# Patient Record
Sex: FEMALE | Race: WHITE | NOT HISPANIC OR LATINO | URBAN - METROPOLITAN AREA
[De-identification: names, ages, dates, MRNs, and addresses within clinical notes are randomized per-mention and may not be internally consistent; named-entity substitution may affect disease eponyms.]

---

## 2020-07-24 ENCOUNTER — TELEPHONE ENCOUNTER (OUTPATIENT)
Dept: URBAN - METROPOLITAN AREA CLINIC 92 | Facility: CLINIC | Age: 40
End: 2020-07-24

## 2020-10-06 ENCOUNTER — TELEPHONE ENCOUNTER (OUTPATIENT)
Dept: URBAN - METROPOLITAN AREA CLINIC 92 | Facility: CLINIC | Age: 40
End: 2020-10-06

## 2020-10-06 RX ORDER — METOCLOPRAMIDE 5 MG/1
1 TABLET BEFORE MEALS TABLET ORAL
Qty: 90 | Refills: 0

## 2020-10-15 ENCOUNTER — OFFICE VISIT (OUTPATIENT)
Dept: URBAN - METROPOLITAN AREA CLINIC 40 | Facility: CLINIC | Age: 40
End: 2020-10-15

## 2020-10-22 ENCOUNTER — OFFICE VISIT (OUTPATIENT)
Dept: URBAN - METROPOLITAN AREA CLINIC 40 | Facility: CLINIC | Age: 40
End: 2020-10-22

## 2020-10-26 ENCOUNTER — OFFICE VISIT (OUTPATIENT)
Dept: URBAN - METROPOLITAN AREA TELEHEALTH 2 | Facility: TELEHEALTH | Age: 40
End: 2020-10-26
Payer: COMMERCIAL

## 2020-10-26 DIAGNOSIS — E55.9 VITAMIN D DEFICIENCY: ICD-10-CM

## 2020-10-26 DIAGNOSIS — K21.9 GERD: ICD-10-CM

## 2020-10-26 DIAGNOSIS — K31.84 GASTROPARESIS: ICD-10-CM

## 2020-10-26 PROBLEM — 34713006 VITAMIN D DEFICIENCY: Status: ACTIVE | Noted: 2020-10-26

## 2020-10-26 PROCEDURE — G8427 DOCREV CUR MEDS BY ELIG CLIN: HCPCS | Performed by: INTERNAL MEDICINE

## 2020-10-26 PROCEDURE — 99213 OFFICE O/P EST LOW 20 MIN: CPT | Performed by: INTERNAL MEDICINE

## 2020-10-26 RX ORDER — METOCLOPRAMIDE 5 MG/1
1 TABLET BEFORE MEALS TABLET ORAL
Qty: 90 | Refills: 0 | Status: ACTIVE | COMMUNITY

## 2020-10-26 RX ORDER — METOCLOPRAMIDE 5 MG/1
1 TABLET BEFORE MEALS TABLET ORAL TID
Qty: 270 TABLET | Refills: 1

## 2020-10-26 RX ORDER — PANTOPRAZOLE SODIUM 40 MG/1
1 TABLET TABLET, DELAYED RELEASE ORAL ONCE A DAY
Qty: 90 | Refills: 1
Start: 2020-01-10

## 2020-10-26 RX ORDER — PANTOPRAZOLE SODIUM 40 MG/1
TAKE ONE TABLET BY MOUTH DAILY TABLET, DELAYED RELEASE ORAL
Qty: 90 | Refills: 0 | Status: ACTIVE | COMMUNITY
Start: 2020-01-10

## 2020-10-26 NOTE — HPI-TODAY'S VISIT:
This is a follow-up appointment for Ms. Farris, a 40 year old /White female, after a previous visit on 10/23/2019, for an evaluation for gastroparesis. The patient states that they are doing well with no current complaints. Requesting refills of both pantoprazole and Reglan. Denies AEs to the medications. She has been unable to wean pantoprazole. Can go only one day without medication until heartburn returns. Admits she is eating more spicy dishes as she is preparing more meals at home.They are mostly compliant with their gastroparesis diet. The patient does not take narcotic pain medicine and does not take medications that decrease motility. No abdominal pain. She is moving her bowels daily. The patient is not a diabetic. At last appointment she was noted to be VitaminD deficient and she was previously treated with Vitamin D shot. The patient has had an upper endoscopy. The EGD findings are in the database of the chart and were reviewed with the patient. Currently on prednisone for poison oak, [place].

## 2021-03-09 ENCOUNTER — LAB OUTSIDE AN ENCOUNTER (OUTPATIENT)
Dept: URBAN - METROPOLITAN AREA CLINIC 40 | Facility: CLINIC | Age: 41
End: 2021-03-09

## 2021-03-09 ENCOUNTER — TELEPHONE ENCOUNTER (OUTPATIENT)
Dept: URBAN - METROPOLITAN AREA CLINIC 92 | Facility: CLINIC | Age: 41
End: 2021-03-09

## 2021-03-09 ENCOUNTER — WEB ENCOUNTER (OUTPATIENT)
Dept: URBAN - METROPOLITAN AREA CLINIC 40 | Facility: CLINIC | Age: 41
End: 2021-03-09

## 2021-03-09 ENCOUNTER — OFFICE VISIT (OUTPATIENT)
Dept: URBAN - METROPOLITAN AREA CLINIC 40 | Facility: CLINIC | Age: 41
End: 2021-03-09
Payer: COMMERCIAL

## 2021-03-09 DIAGNOSIS — R11.2 NAUSEA WITH VOMITING, UNSPECIFIED: ICD-10-CM

## 2021-03-09 DIAGNOSIS — R19.7 DIARRHEA, UNSPECIFIED: ICD-10-CM

## 2021-03-09 PROCEDURE — 99214 OFFICE O/P EST MOD 30 MIN: CPT | Performed by: INTERNAL MEDICINE

## 2021-03-09 RX ORDER — PROMETHAZINE HYDROCHLORIDE 25 MG/1
1 SUPPOSITORY AS NEEDED SUPPOSITORY RECTAL
Qty: 60 | OUTPATIENT
Start: 2021-03-09 | End: 2021-04-08

## 2021-03-09 RX ORDER — PANTOPRAZOLE SODIUM 40 MG/1
1 TABLET TABLET, DELAYED RELEASE ORAL ONCE A DAY
Qty: 90 | Refills: 1 | Status: ACTIVE | COMMUNITY
Start: 2020-01-10

## 2021-03-09 RX ORDER — DICYCLOMINE HYDROCHLORIDE 10 MG/1
1 TABLET CAPSULE ORAL
Qty: 120 TABLET | Refills: 1 | OUTPATIENT
Start: 2021-03-09 | End: 2021-05-08

## 2021-03-09 RX ORDER — METOCLOPRAMIDE 5 MG/1
1 TABLET BEFORE MEALS TABLET ORAL TID
Qty: 270 TABLET | Refills: 1 | Status: ACTIVE | COMMUNITY

## 2021-03-09 NOTE — HPI-TODAY'S VISIT:
Ms. Farris presents for an unscheduled appointment due to nausea vomiting and diarrhea.  Call her following her for gastroparesis and reflux.  She was last seen by the physician assistant on a telehealth appointment in October.  She was stable on her Reglan and pantoprazole.  She was doing well until 830 last night.  She had an acute onset of nausea and vomiting.  This was severe to the point of retching.  No blood in the vomit.  She noted stabbing midepigastric pain as well as diarrhea that was tan in color.  She took a Phenergan but this did not help.  She has been unable to keep anything down this morning.  In terms of meals yesterday she had toast followed by egg salad sandwich and then for supper had nachos with beans and cheese. No fevers or chills.

## 2021-03-10 ENCOUNTER — TELEPHONE ENCOUNTER (OUTPATIENT)
Dept: URBAN - METROPOLITAN AREA CLINIC 40 | Facility: CLINIC | Age: 41
End: 2021-03-10

## 2021-03-11 LAB
A/G RATIO: 2.1
ALBUMIN: 4.4
ALKALINE PHOSPHATASE: 90
ALT (SGPT): 56
APPEARANCE: CLEAR
AST (SGOT): 40
BACTERIA: (no result)
BASO (ABSOLUTE): 0
BASOS: 1
BILIRUBIN, TOTAL: 0.8
BILIRUBIN: NEGATIVE
BUN/CREATININE RATIO: 9
BUN: 7
C-REACTIVE PROTEIN, QUANT: 2
CALCIUM: 9
CARBON DIOXIDE, TOTAL: 23
CAST TYPE: (no result)
CASTS: (no result)
CHLORIDE: 102
COMMENT: (no result)
CREATININE: 0.82
CRYSTAL TYPE: (no result)
CRYSTALS: (no result)
EGFR IF AFRICN AM: 104
EGFR IF NONAFRICN AM: 90
EOS (ABSOLUTE): 0.1
EOS: 2
EPITHELIAL CELLS (NON RENAL): (no result)
EPITHELIAL CELLS (RENAL): (no result)
GLOBULIN, TOTAL: 2.1
GLUCOSE: 121
GLUCOSE: NEGATIVE
HEMATOCRIT: 45.8
HEMATOLOGY COMMENTS:: (no result)
HEMOGLOBIN: 15.5
IMMATURE CELLS: (no result)
IMMATURE GRANS (ABS): 0
IMMATURE GRANULOCYTES: 0
KETONES: NEGATIVE
LIPASE: 22
LYMPHS (ABSOLUTE): 1.9
LYMPHS: 31
MCH: 32.7
MCHC: 33.8
MCV: 97
MICROSCOPIC EXAMINATION: (no result)
MICROSCOPIC EXAMINATION: (no result)
MONOCYTES(ABSOLUTE): 0.6
MONOCYTES: 10
MUCUS THREADS: PRESENT
NEUTROPHILS (ABSOLUTE): 3.6
NEUTROPHILS: 56
NITRITE, URINE: NEGATIVE
NRBC: (no result)
OCCULT BLOOD: NEGATIVE
PH: 6.5
PLATELETS: 239
POTASSIUM: 4.7
PROTEIN, TOTAL: 6.5
PROTEIN: NEGATIVE
RBC: (no result)
RBC: 4.74
RDW: 12.5
SODIUM: 137
SPECIFIC GRAVITY: 1.01
TRICHOMONAS: (no result)
URINE-COLOR: YELLOW
UROBILINOGEN,SEMI-QN: 0.2
WBC ESTERASE: NEGATIVE
WBC: (no result)
WBC: 6.3
YEAST: (no result)

## 2021-03-23 ENCOUNTER — WEB ENCOUNTER (OUTPATIENT)
Dept: URBAN - METROPOLITAN AREA CLINIC 40 | Facility: CLINIC | Age: 41
End: 2021-03-23

## 2021-03-24 ENCOUNTER — WEB ENCOUNTER (OUTPATIENT)
Dept: URBAN - METROPOLITAN AREA CLINIC 40 | Facility: CLINIC | Age: 41
End: 2021-03-24

## 2021-03-25 ENCOUNTER — WEB ENCOUNTER (OUTPATIENT)
Dept: URBAN - METROPOLITAN AREA CLINIC 40 | Facility: CLINIC | Age: 41
End: 2021-03-25

## 2021-03-25 RX ORDER — METOCLOPRAMIDE 5 MG/1
1 TABLET BEFORE MEALS TABLET ORAL TID
OUTPATIENT

## 2021-03-25 RX ORDER — METOCLOPRAMIDE 10 MG/1
1 TABLET BEFORE MEALS TABLET ORAL
Qty: 360 TABLET | Refills: 1 | OUTPATIENT
Start: 2021-03-26

## 2021-04-07 ENCOUNTER — OFFICE VISIT (OUTPATIENT)
Dept: URBAN - METROPOLITAN AREA CLINIC 40 | Facility: CLINIC | Age: 41
End: 2021-04-07

## 2021-04-28 ENCOUNTER — OFFICE VISIT (OUTPATIENT)
Dept: URBAN - METROPOLITAN AREA CLINIC 40 | Facility: CLINIC | Age: 41
End: 2021-04-28

## 2021-05-10 ENCOUNTER — WEB ENCOUNTER (OUTPATIENT)
Dept: URBAN - METROPOLITAN AREA CLINIC 40 | Facility: CLINIC | Age: 41
End: 2021-05-10

## 2021-05-11 ENCOUNTER — WEB ENCOUNTER (OUTPATIENT)
Dept: URBAN - METROPOLITAN AREA CLINIC 40 | Facility: CLINIC | Age: 41
End: 2021-05-11

## 2021-06-09 ENCOUNTER — OFFICE VISIT (OUTPATIENT)
Dept: URBAN - METROPOLITAN AREA CLINIC 40 | Facility: CLINIC | Age: 41
End: 2021-06-09

## 2021-06-23 ENCOUNTER — LAB OUTSIDE AN ENCOUNTER (OUTPATIENT)
Dept: URBAN - METROPOLITAN AREA CLINIC 40 | Facility: CLINIC | Age: 41
End: 2021-06-23

## 2021-06-23 ENCOUNTER — OFFICE VISIT (OUTPATIENT)
Dept: URBAN - METROPOLITAN AREA CLINIC 40 | Facility: CLINIC | Age: 41
End: 2021-06-23
Payer: COMMERCIAL

## 2021-06-23 DIAGNOSIS — K21.9 GERD: ICD-10-CM

## 2021-06-23 DIAGNOSIS — K76.0 FATTY LIVER: ICD-10-CM

## 2021-06-23 DIAGNOSIS — K31.84 GASTROPARESIS: ICD-10-CM

## 2021-06-23 PROCEDURE — 99214 OFFICE O/P EST MOD 30 MIN: CPT | Performed by: INTERNAL MEDICINE

## 2021-06-23 RX ORDER — PANTOPRAZOLE SODIUM 40 MG/1
1 TABLET TABLET, DELAYED RELEASE ORAL ONCE A DAY
Qty: 90 | Refills: 0
Start: 2020-01-10

## 2021-06-23 RX ORDER — METOCLOPRAMIDE 10 MG/1
1 TABLET BEFORE MEALS TABLET ORAL
Qty: 360 TABLET | Refills: 1 | Status: ACTIVE | COMMUNITY
Start: 2021-03-26

## 2021-06-23 RX ORDER — METFORMIN HYDROCHLORIDE 500 MG/1
AS DIRECTED TABLET, FILM COATED ORAL
Status: ACTIVE | COMMUNITY

## 2021-06-23 RX ORDER — METOCLOPRAMIDE 10 MG/1
1 TABLET BEFORE MEALS TABLET ORAL
Qty: 360 | Refills: 0
Start: 2021-03-26

## 2021-06-23 RX ORDER — PANTOPRAZOLE SODIUM 40 MG/1
1 TABLET TABLET, DELAYED RELEASE ORAL ONCE A DAY
Qty: 90 | Refills: 1 | Status: ACTIVE | COMMUNITY
Start: 2020-01-10

## 2021-06-23 NOTE — HPI-TODAY'S VISIT:
Ms. Farris presents to clinic for follow-up.  She was last seen on March 9.  At that appointment she was complaining of diarrhea and abdominal pain.  We got a stat ultrasound that showed gallbladder sludge and a positive Garcia sign.  It also showed fatty liver.  She is referred to general surgery and had cholecystectomy with Dr. Cortés that was complicated by aspiration that she recovered after a 1 night stay in the hospital.  Patient contacted me and noted some worsening of her gastroparesis after surgery.  We increased her Reglan from 5 to 10 mg.  She states that has improved her symptoms immensely.  No side effects from the medication.  Her blood sugar control is also improved.  She was formally diagnosed as diabetic and started on Metformin.  She brings blood work that shows her hemoglobin A1c down to 6.3.  Her ALT is also improved to 38.  Despite her pantoprazole she has noted some breakthrough in her reflux.  She is concerned that her fundoplication may have slipped.  She would like evaluation for that.  Recall her last EGD was February 2018 where we first noted the retained food in her stomach and diagnosed her with gastroparesis.  She denies any dysphagia.  She is moving her bowels daily.

## 2021-06-24 ENCOUNTER — OFFICE VISIT (OUTPATIENT)
Dept: URBAN - METROPOLITAN AREA TELEHEALTH 2 | Facility: TELEHEALTH | Age: 41
End: 2021-06-24
Payer: COMMERCIAL

## 2021-06-24 DIAGNOSIS — K31.84 GASTROPARESIS: ICD-10-CM

## 2021-06-24 PROCEDURE — 97802 MEDICAL NUTRITION INDIV IN: CPT | Performed by: DIETITIAN, REGISTERED

## 2021-06-24 RX ORDER — PANTOPRAZOLE SODIUM 40 MG/1
1 TABLET TABLET, DELAYED RELEASE ORAL ONCE A DAY
Qty: 90 | Refills: 0 | Status: ACTIVE | COMMUNITY
Start: 2020-01-10

## 2021-06-24 RX ORDER — METFORMIN HYDROCHLORIDE 500 MG/1
AS DIRECTED TABLET, FILM COATED ORAL
Status: ACTIVE | COMMUNITY

## 2021-06-24 RX ORDER — METOCLOPRAMIDE 10 MG/1
1 TABLET BEFORE MEALS TABLET ORAL
Qty: 360 | Refills: 0 | Status: ACTIVE | COMMUNITY
Start: 2021-03-26

## 2021-07-20 ENCOUNTER — WEB ENCOUNTER (OUTPATIENT)
Dept: URBAN - METROPOLITAN AREA CLINIC 40 | Facility: CLINIC | Age: 41
End: 2021-07-20

## 2021-08-17 ENCOUNTER — WEB ENCOUNTER (OUTPATIENT)
Dept: URBAN - METROPOLITAN AREA CLINIC 40 | Facility: CLINIC | Age: 41
End: 2021-08-17

## 2021-08-24 ENCOUNTER — OFFICE VISIT (OUTPATIENT)
Dept: URBAN - METROPOLITAN AREA CLINIC 40 | Facility: CLINIC | Age: 41
End: 2021-08-24

## 2021-10-13 ENCOUNTER — OFFICE VISIT (OUTPATIENT)
Dept: URBAN - METROPOLITAN AREA CLINIC 40 | Facility: CLINIC | Age: 41
End: 2021-10-13

## 2021-10-13 RX ORDER — METOCLOPRAMIDE 10 MG/1
1 TABLET BEFORE MEALS TABLET ORAL
Qty: 360 | Refills: 0 | Status: ACTIVE | COMMUNITY
Start: 2021-03-26

## 2021-10-13 RX ORDER — PANTOPRAZOLE SODIUM 40 MG/1
1 TABLET TABLET, DELAYED RELEASE ORAL ONCE A DAY
Qty: 90 | Refills: 0 | Status: ACTIVE | COMMUNITY
Start: 2020-01-10

## 2021-10-13 RX ORDER — METFORMIN HYDROCHLORIDE 500 MG/1
AS DIRECTED TABLET, FILM COATED ORAL
Status: ACTIVE | COMMUNITY

## 2022-01-21 ENCOUNTER — OFFICE VISIT (OUTPATIENT)
Dept: URBAN - METROPOLITAN AREA TELEHEALTH 2 | Facility: TELEHEALTH | Age: 42
End: 2022-01-21
Payer: COMMERCIAL

## 2022-01-21 VITALS — HEIGHT: 63 IN

## 2022-01-21 DIAGNOSIS — K76.0 FATTY LIVER: ICD-10-CM

## 2022-01-21 DIAGNOSIS — K21.9 GERD: ICD-10-CM

## 2022-01-21 DIAGNOSIS — Z98.890 HISTORY OF NISSEN FUNDOPLICATION: ICD-10-CM

## 2022-01-21 DIAGNOSIS — K31.84 GASTROPARESIS: ICD-10-CM

## 2022-01-21 PROCEDURE — 99213 OFFICE O/P EST LOW 20 MIN: CPT | Performed by: INTERNAL MEDICINE

## 2022-01-21 RX ORDER — PANTOPRAZOLE SODIUM 40 MG/1
1 TABLET TABLET, DELAYED RELEASE ORAL BID
Qty: 180 TABLET | Refills: 1

## 2022-01-21 RX ORDER — METFORMIN HYDROCHLORIDE 500 MG/1
AS DIRECTED TABLET, FILM COATED ORAL
Status: ACTIVE | COMMUNITY

## 2022-01-21 RX ORDER — PANTOPRAZOLE SODIUM 40 MG/1
1 TABLET TABLET, DELAYED RELEASE ORAL ONCE A DAY
Qty: 90 | Refills: 0 | Status: ACTIVE | COMMUNITY
Start: 2020-01-10

## 2022-01-21 RX ORDER — METOCLOPRAMIDE 10 MG/1
1 TABLET BEFORE MEALS TABLET ORAL
Qty: 360 | Refills: 1

## 2022-01-21 RX ORDER — METOCLOPRAMIDE 10 MG/1
1 TABLET BEFORE MEALS TABLET ORAL
Qty: 360 | Refills: 0 | Status: ACTIVE | COMMUNITY
Start: 2021-03-26

## 2022-01-21 NOTE — HPI-TODAY'S VISIT:
Ms. Farris is a 41 year old White female who presents for follow-up. She has been seen by  dietitian wit history of GERD, gastroparesis. Last seen by Dr. Montelongo 6/24/21 with prior complaint of diarrhea and abdominal pain.  She had a stat ultrasound that showed gallbladder sludge and a positive Garcia sign.  It also showed fatty liver.  She is referred to general surgery and had cholecystectomy with Dr. Cortés that was complicated by aspiration that she recovered after a one night stay in the hospital.  Patient noted some worsening of her gastroparesis after surgery.  We increased her Reglan from 5 to 10 mg.  She states that has improved her symptoms immensely.  No side effects from the medication.  Her blood sugar control is also improved.  She was formally diagnosed as diabetic and started on Metformin.  She brings blood work that shows her hemoglobin A1c down to 6.3.  Her ALT has also improved, down to 38.  Despite her pantoprazole,  she has noted some breakthrough in her reflux.  She is concerned that her fundoplication may have slipped.  She requested evaluation for that.  Recall her last EGD was February 2018 where we first noted the retained food in her stomach and diagnosed her with gastroparesis.  She denies any dysphagia. Significant reflux in supine position with recent UGI series /BS in late August 2021. No hiatal hernia. Remains on bid ppi daily. Doing well with this and Reglan. Requesting refills as her insurance is changing. Diet fair, moderate fiber in diet. Drinks coffee in AM, some alcohol at night to help her sleep. Admits stressors. Otherwise doing well. Appetite good. Weight stable.

## 2022-01-24 PROBLEM — 428882003 HISTORY OF CHOLECYSTECTOMY: Status: ACTIVE | Noted: 2022-01-21

## 2022-01-24 PROBLEM — 235595009 GASTROESOPHAGEAL REFLUX DISEASE: Status: ACTIVE | Noted: 2020-10-26

## 2022-01-24 PROBLEM — 197321007 FATTY LIVER: Status: ACTIVE | Noted: 2021-06-23

## 2022-01-24 PROBLEM — 235675006 GASTROPARESIS: Status: ACTIVE | Noted: 2020-10-26

## 2022-04-29 ENCOUNTER — DASHBOARD ENCOUNTERS (OUTPATIENT)
Age: 42
End: 2022-04-29

## 2022-05-06 ENCOUNTER — OFFICE VISIT (OUTPATIENT)
Dept: URBAN - METROPOLITAN AREA CLINIC 40 | Facility: CLINIC | Age: 42
End: 2022-05-06

## 2022-05-06 RX ORDER — PANTOPRAZOLE SODIUM 40 MG/1
1 TABLET TABLET, DELAYED RELEASE ORAL BID
Qty: 180 TABLET | Refills: 1 | Status: ACTIVE | COMMUNITY

## 2022-05-06 RX ORDER — METOCLOPRAMIDE 10 MG/1
1 TABLET BEFORE MEALS TABLET ORAL
Qty: 360 | Refills: 1 | Status: ACTIVE | COMMUNITY

## 2022-05-06 RX ORDER — METFORMIN HYDROCHLORIDE 500 MG/1
AS DIRECTED TABLET, FILM COATED ORAL
Status: ACTIVE | COMMUNITY